# Patient Record
Sex: FEMALE | Race: AMERICAN INDIAN OR ALASKA NATIVE
[De-identification: names, ages, dates, MRNs, and addresses within clinical notes are randomized per-mention and may not be internally consistent; named-entity substitution may affect disease eponyms.]

---

## 2017-04-19 ENCOUNTER — HOSPITAL ENCOUNTER (OUTPATIENT)
Dept: HOSPITAL 5 - OPU | Age: 37
Discharge: HOME | End: 2017-04-19
Attending: INTERNAL MEDICINE
Payer: COMMERCIAL

## 2017-04-19 VITALS — SYSTOLIC BLOOD PRESSURE: 139 MMHG | DIASTOLIC BLOOD PRESSURE: 67 MMHG

## 2017-04-19 DIAGNOSIS — E05.10: Primary | ICD-10-CM

## 2017-04-19 PROCEDURE — 88172 CYTP DX EVAL FNA 1ST EA SITE: CPT

## 2017-04-19 PROCEDURE — 88305 TISSUE EXAM BY PATHOLOGIST: CPT

## 2017-04-19 PROCEDURE — 60100 BIOPSY OF THYROID: CPT

## 2017-04-19 PROCEDURE — 76942 ECHO GUIDE FOR BIOPSY: CPT

## 2017-04-19 PROCEDURE — 88112 CYTOPATH CELL ENHANCE TECH: CPT

## 2017-04-19 PROCEDURE — 88173 CYTOPATH EVAL FNA REPORT: CPT

## 2017-04-19 NOTE — SHORT STAY SUMMARY
Short Stay Documentation


Date of service: 04/19/17





- History


Principal diagnosis: left thyroid nodule


H&P: obtained from office





- Allergies and Medications


Current Medications: 


 Allergies





No Known Allergies Allergy (Unverified 04/19/17 06:53)


 





 Home Medications











 Medication  Instructions  Recorded  Confirmed  Last Taken  Type


 


Levothyroxine [Synthroid] 25 mcg PO DAILY 04/19/17 04/19/17 04/18/17 History





    25mcg 


 


Metformin HCl [metFORMIN ER] 500 mg PO BID 04/19/17 04/19/17 04/05/17 History














- Physical exam


General appearance: no acute distress


HEENT: Atraumatic, PERRLA, Mucous membr. moist/pink





- Brief post op/procedure progress note


Date of procedure: 04/19/17


Pre-op diagnosis: left thyroid nodule


Post-op diagnosis: same


Procedure: 





US thyroid biopsy


Anesthesia: local


Findings: 





2.3cm left thyroid nodule


Surgeon: CARTER ANDERSON


Estimated blood loss: none


Pathology: list (FNA x 2, rotex biopsy)


Specimen disposition: to lab


Condition: stable





- Disposition


Condition at discharge: Good


Disposition: DISCHARGED TO HOME OR SELFCARE





Short Stay Discharge Plan


Follow up with: 


BRIEN BURTON MD [Primary Care Provider] - 7 Days

## 2019-12-01 ENCOUNTER — HOSPITAL ENCOUNTER (EMERGENCY)
Dept: HOSPITAL 5 - ED | Age: 39
Discharge: LEFT BEFORE DISCHARGE | End: 2019-12-01
Payer: SELF-PAY

## 2019-12-01 DIAGNOSIS — Z53.21: ICD-10-CM

## 2019-12-01 DIAGNOSIS — R05: Primary | ICD-10-CM

## 2019-12-01 PROCEDURE — 87400 INFLUENZA A/B EACH AG IA: CPT

## 2019-12-01 PROCEDURE — 93010 ELECTROCARDIOGRAM REPORT: CPT

## 2019-12-01 PROCEDURE — 71046 X-RAY EXAM CHEST 2 VIEWS: CPT

## 2019-12-01 PROCEDURE — 93005 ELECTROCARDIOGRAM TRACING: CPT

## 2019-12-01 NOTE — EVENT NOTE
ED Screening Note


Date of service: 12/01/19


Time: 14:56


ED Screening Note: 








This initial assessment/diagnostic orders/clinical plan/treatment(s) is/are 

subject to change based on patients health status, clinical progression and re-

assessment by fellow clinical providers in the ED. Further treatment and workup 

at subsequent clinical providers discretion. Patient/guardian urged not to elope

from the ED as their condition may be serious if not clinically assessed and 

managed. 








40yo female c/o productive cough x 2 weeks. Last 2 days bodyaches and chills. 

C/o chest pain off and on while working. She cleans offices. Pt also report hx 

of thyroid disease and has not taken thyroid meds for months. 





Initial orders include: 


Chest xray


EKG


Flu swab

## 2019-12-01 NOTE — XRAY REPORT
CHEST 2 VIEWS 



INDICATION: 

cough chest pain.



COMPARISON: 

None



FINDINGS:

Support devices: None.



Heart: Within normal limits. 

Lungs/pleura: No acute air space or interstitial disease.  No pneumothorax.



Additional findings: None.



IMPRESSION:

1. No acute findings.



Signer Name: Neo Gary MD 

Signed: 12/1/2019 3:47 PM

 Workstation Name: [email or web address]